# Patient Record
Sex: FEMALE | Race: WHITE | NOT HISPANIC OR LATINO | URBAN - METROPOLITAN AREA
[De-identification: names, ages, dates, MRNs, and addresses within clinical notes are randomized per-mention and may not be internally consistent; named-entity substitution may affect disease eponyms.]

---

## 2023-11-21 ENCOUNTER — APPOINTMENT (OUTPATIENT)
Dept: OBSTETRICS AND GYNECOLOGY | Facility: CLINIC | Age: 23
End: 2023-11-21
Payer: COMMERCIAL

## 2023-12-19 ENCOUNTER — OFFICE VISIT (OUTPATIENT)
Dept: OBSTETRICS AND GYNECOLOGY | Facility: CLINIC | Age: 23
End: 2023-12-19
Payer: COMMERCIAL

## 2023-12-19 VITALS
WEIGHT: 125 LBS | BODY MASS INDEX: 19.62 KG/M2 | DIASTOLIC BLOOD PRESSURE: 80 MMHG | HEIGHT: 67 IN | SYSTOLIC BLOOD PRESSURE: 112 MMHG

## 2023-12-19 DIAGNOSIS — Z01.419 WELL WOMAN EXAM WITH ROUTINE GYNECOLOGICAL EXAM: Primary | ICD-10-CM

## 2023-12-19 DIAGNOSIS — Z12.4 ROUTINE CERVICAL SMEAR: ICD-10-CM

## 2023-12-19 DIAGNOSIS — Z11.3 SCREEN FOR STD (SEXUALLY TRANSMITTED DISEASE): ICD-10-CM

## 2023-12-19 PROCEDURE — 88175 CYTOPATH C/V AUTO FLUID REDO: CPT

## 2023-12-19 PROCEDURE — 1036F TOBACCO NON-USER: CPT | Performed by: OBSTETRICS & GYNECOLOGY

## 2023-12-19 PROCEDURE — 99395 PREV VISIT EST AGE 18-39: CPT | Performed by: OBSTETRICS & GYNECOLOGY

## 2023-12-19 PROCEDURE — 87800 DETECT AGNT MULT DNA DIREC: CPT

## 2023-12-19 RX ORDER — DEXTROAMPHETAMINE SACCHARATE, AMPHETAMINE ASPARTATE MONOHYDRATE, DEXTROAMPHETAMINE SULFATE AND AMPHETAMINE SULFATE 5; 5; 5; 5 MG/1; MG/1; MG/1; MG/1
20 CAPSULE, EXTENDED RELEASE ORAL EVERY MORNING
COMMUNITY
Start: 2023-05-22

## 2023-12-19 RX ORDER — ISOTRETINOIN 30 MG/1
30 CAPSULE ORAL
COMMUNITY
End: 2024-05-14 | Stop reason: ALTCHOICE

## 2023-12-19 NOTE — PROGRESS NOTES
"PAP NEVER  STD SCREEN NEVER  GARDASIL X3    HPI  Roseann Garcia is a 23 y.o. G0 presents for annual GYN well woman exam    Mirena IUD placed in 11/2018 for AUB by Dr. Buchanan.   Patient is returning to school in 01/03/24.    - last pap, pap hx: Never. Collected.    - last mammogram, breast hx: n/a  - breast abnormalities: negative for lumps, skin changes, nipple discharge, pain  - last colonoscopy: n/a  - last DEXA scan: n/a    OB hx: No obstetric history on file.   GYN hx:   - menarche, menstrual pattern, LMP: No menses with IUD.  - Sexual activity/issues, STI protection/history, birth control: Agreeable to STD testing. Mirena IUD inserted 11/2018.  - HPV vaccine: complete  FH: Maternal grandmother had breast cancer, diagnosed around age 60. Her sister (pt maternal great aunt) also diagnosed with breast cancer. No GYN related cancers including ovarian, endometrial, or colon cancer.       ROS negative  10 point ROS negative except as listed above.     Physical exam  /80   Ht 1.702 m (5' 7\")   Wt 56.7 kg (125 lb)   BMI 19.58 kg/m²      Physical Exam  Constitutional:       Appearance: Normal appearance.   HENT:      Head: Normocephalic and atraumatic.   Eyes:      Extraocular Movements: Extraocular movements intact.      Conjunctiva/sclera: Conjunctivae normal.      Pupils: Pupils are equal, round, and reactive to light.   Pulmonary:      Effort: Pulmonary effort is normal.   Chest:   Breasts:     Right: Normal.      Left: Normal.   Abdominal:      General: Abdomen is flat.      Palpations: Abdomen is soft.   Genitourinary:     General: Normal vulva.      Vagina: Normal.      Cervix: Normal.      Uterus: Normal.       Adnexa: Right adnexa normal and left adnexa normal.      Comments: IUD strings are visible.  Skin:     General: Skin is warm and dry.   Neurological:      General: No focal deficit present.      Mental Status: She is alert.   Psychiatric:         Mood and Affect: Mood normal.         Behavior: " Behavior normal.         Thought Content: Thought content normal.         Judgment: Judgment normal.         Assessment/Plan  - Discussed Mirena IUD removal timeline for abnormal bleeding vs contraceptive uses.  - Pt to return for IUD removal and reinsertion.  - Pelvic exam is normal, breast exam is normal.  - Pap smear collected.  - Ordered STD screening, gonorrhea and chlamydia of pap smear.  - HPV vaccine: complete.     Scribe Attestation  By signing my name below, IBarbara Scribe   attest that this documentation has been prepared under the direction and in the presence of Polo Childs MD.    Scribe Attestation  By signing my name below, ILaina Scribe   attest that this documentation has been prepared under the direction and in the presence of Polo Childs MD.

## 2023-12-21 LAB
C TRACH RRNA SPEC QL NAA+PROBE: NEGATIVE
N GONORRHOEA DNA SPEC QL PROBE+SIG AMP: NEGATIVE

## 2024-01-09 LAB
CYTOLOGY CMNT CVX/VAG CYTO-IMP: NORMAL
LAB AP HPV GENOTYPE QUESTION: YES
LAB AP HPV HR: NORMAL
LAB AP PAP ADDITIONAL TESTS: NORMAL
LABORATORY COMMENT REPORT: NORMAL
PATH REPORT.TOTAL CANCER: NORMAL

## 2024-01-15 PROBLEM — Z01.419 WELL WOMAN EXAM WITH ROUTINE GYNECOLOGICAL EXAM: Status: ACTIVE | Noted: 2024-01-15

## 2024-02-14 ENCOUNTER — TELEPHONE (OUTPATIENT)
Dept: OBSTETRICS AND GYNECOLOGY | Facility: CLINIC | Age: 24
End: 2024-02-14
Payer: COMMERCIAL

## 2024-05-14 ENCOUNTER — PROCEDURE VISIT (OUTPATIENT)
Dept: OBSTETRICS AND GYNECOLOGY | Facility: CLINIC | Age: 24
End: 2024-05-14
Payer: COMMERCIAL

## 2024-05-14 VITALS — WEIGHT: 130.2 LBS | BODY MASS INDEX: 20.39 KG/M2 | DIASTOLIC BLOOD PRESSURE: 80 MMHG | SYSTOLIC BLOOD PRESSURE: 110 MMHG

## 2024-05-14 DIAGNOSIS — Z30.430 ENCOUNTER FOR IUD INSERTION: ICD-10-CM

## 2024-05-14 DIAGNOSIS — Z30.433 ENCOUNTER FOR IUD REMOVAL AND REINSERTION: Primary | ICD-10-CM

## 2024-05-14 DIAGNOSIS — Z32.02 PREGNANCY TEST NEGATIVE: ICD-10-CM

## 2024-05-14 LAB — PREGNANCY TEST URINE, POC: NEGATIVE

## 2024-05-14 PROCEDURE — 58300 INSERT INTRAUTERINE DEVICE: CPT | Performed by: OBSTETRICS & GYNECOLOGY

## 2024-05-14 PROCEDURE — 81025 URINE PREGNANCY TEST: CPT | Performed by: OBSTETRICS & GYNECOLOGY

## 2024-05-14 PROCEDURE — 58301 REMOVE INTRAUTERINE DEVICE: CPT | Performed by: OBSTETRICS & GYNECOLOGY

## 2024-05-14 NOTE — PATIENT INSTRUCTIONS
Discussed plan of care in detail. Patient is in agreement and expressed understanding. All questions were answered.

## 2024-05-14 NOTE — PROGRESS NOTES
HPI  Roseann Garcia is a 23 y.o. G0 with a history of AUB who is presenting today for IUD removal and re-insertion.  Mirena IUD insertion in 11/2018 for AUB  GC/CT negative 12/2023    OB hx: G0  GYN hx:   - menarche, menstrual pattern, LMP: No menses with IUD.  - HPV vaccine: complete  FH: Maternal grandmother had breast cancer, diagnosed around age 60. Her sister (pt maternal great aunt) also diagnosed with breast cancer. No GYN related cancers including ovarian, endometrial, or colon cancer.     VITAL SIGNS  /80   Wt 59.1 kg (130 lb 3.2 oz)   BMI 20.39 kg/m²     IUD Removal    Date/Time: 5/14/2024 3:29 PM    Performed by: Polo Childs MD  Authorized by: Polo Childs MD    Consent:     Consent obtained:  Verbal    Consent given by:  Patient    Procedure risks and benefits discussed: yes      Patient questions answered: yes      Patient agrees, verbalizes understanding, and wants to proceed: yes    Procedure:     Removed with no complications: yes    IUD Insertion    Date/Time: 5/14/2024 3:30 PM    Performed by: Polo Childs MD  Authorized by: Polo Childs MD    Consent:     Consent obtained:  Verbal and written    Consent given by:  Patient    Procedure risks and benefits discussed: yes      Patient questions answered: yes      Patient agrees, verbalizes understanding, and wants to proceed: yes      Educational handouts given: yes      Instructions and paperwork completed: yes    Procedure:     Pelvic exam performed: yes      Negative urine pregnancy test: yes      Cervix cleaned and prepped: yes      Speculum placed in vagina: yes      Tenaculum applied to cervix: yes      Uterus sounded: yes      Uterus sound depth (cm):  7    IUD inserted with no complications: yes      IUD type:  Mirena    Strings trimmed: yes    Post-procedure:     Patient tolerated procedure well: yes    Comments:      Patient declined use of lidocaine.      Assessment/Plan   IUD removal completed and Mirena IUD inserted  without complications.  Procedure well tolerated by patient  RTC in 2 months for IUD check.  HCG neg  GC/CT neg 12/2023  TC to patient after visit. Discussed risk of infection, offered GC/CT screening with urine sample through  lab. Pt declines STI screening.     Scribe Attestation  By signing my name below, IBarbara Scribe   attest that this documentation has been prepared under the direction and in the presence of Polo Childs MD.